# Patient Record
Sex: MALE | Race: WHITE | Employment: FULL TIME | URBAN - METROPOLITAN AREA
[De-identification: names, ages, dates, MRNs, and addresses within clinical notes are randomized per-mention and may not be internally consistent; named-entity substitution may affect disease eponyms.]

---

## 2020-07-13 ENCOUNTER — OFFICE VISIT (OUTPATIENT)
Dept: CARDIOLOGY CLINIC | Age: 55
End: 2020-07-13
Payer: OTHER GOVERNMENT

## 2020-07-13 VITALS
OXYGEN SATURATION: 98 % | TEMPERATURE: 98 F | WEIGHT: 202.8 LBS | DIASTOLIC BLOOD PRESSURE: 64 MMHG | HEIGHT: 71 IN | BODY MASS INDEX: 28.39 KG/M2 | SYSTOLIC BLOOD PRESSURE: 106 MMHG | HEART RATE: 96 BPM

## 2020-07-13 PROCEDURE — 99204 OFFICE O/P NEW MOD 45 MIN: CPT | Performed by: INTERNAL MEDICINE

## 2020-07-13 PROCEDURE — 93000 ELECTROCARDIOGRAM COMPLETE: CPT | Performed by: INTERNAL MEDICINE

## 2020-07-13 RX ORDER — METFORMIN HYDROCHLORIDE 500 MG/1
500 TABLET, EXTENDED RELEASE ORAL 2 TIMES DAILY WITH MEALS
COMMUNITY

## 2020-07-13 RX ORDER — DICLOFENAC SODIUM 75 MG/1
75 TABLET, DELAYED RELEASE ORAL 2 TIMES DAILY
COMMUNITY
Start: 2019-01-17

## 2020-07-13 RX ORDER — PANTOPRAZOLE SODIUM 40 MG/1
40 TABLET, DELAYED RELEASE ORAL DAILY
COMMUNITY

## 2020-07-13 RX ORDER — INSULIN GLARGINE 100 [IU]/ML
INJECTION, SOLUTION SUBCUTANEOUS
COMMUNITY

## 2020-07-13 RX ORDER — ASPIRIN 81 MG/1
81 TABLET, CHEWABLE ORAL DAILY
COMMUNITY

## 2020-07-13 RX ORDER — HYDRALAZINE HYDROCHLORIDE 25 MG/1
25 TABLET, FILM COATED ORAL 2 TIMES DAILY
COMMUNITY
End: 2020-07-13

## 2020-07-13 RX ORDER — LEVOTHYROXINE SODIUM 0.1 MG/1
100 TABLET ORAL DAILY
COMMUNITY

## 2020-07-13 RX ORDER — GABAPENTIN 100 MG/1
100 CAPSULE ORAL 3 TIMES DAILY
COMMUNITY

## 2020-07-13 NOTE — LETTER
[address] Cardiology - [facility]  [address] [place]  Phone: [phone]  Fax: [phone]    Maxim Wild MD        July 14, 2020     Josafat Monzon  [address]  [address] [address]    Patient: Kath Walsh  MR Number: [id number]  YOB: 1965  Date of Visit: 7/13/2020    Dear Dr. Josafat Monzon: Thank you for your referral. Progress note attached in visit summary. If you have questions, please do not hesitate to call me. I look forward to following David Uribe along with you.     Sincerely,        Maxim Wild MD

## 2020-07-13 NOTE — PATIENT INSTRUCTIONS
Patient Education        Tilt Table Test: About This Test  What is it? A tilt table test is used to check people who have fainted or who often feel lightheaded. The results help your doctor know the cause of your fainting or feeling lightheaded. The test uses a special table that slowly tilts you to an upright position. It checks how your body responds when you change positions. Why is this test done? This test is done to find out why you might be having certain symptoms, such as fainting or feeling lightheaded. Your doctor can see if your symptoms are caused by problems with your heart rate or blood pressure. How can you prepare for the test?  · You may be asked to not eat or drink for a few hours before the test.  · You may need to have someone drive you home after the test.  · Tell your doctor ALL the medicines, vitamins, supplements, and herbal remedies you take. Some may increase the risk of problems during your test. Your doctor will tell you if you should stop taking any of them before the test and how soon to do it. How is the test done? · The test is usually done in a hospital or a cardiologist's office. · You will have small patches or pads attached to your skin. These are sensors that monitor your heart. You will also have a blood pressure cuff on your arm. And you may have an IV. · During the test, you will lie flat on a table that can tilt you up to almost a standing position. You will be strapped securely to the table. · Your heart rate and blood pressure are checked regularly as the table is tilted up. · You will be asked if you feel any symptoms like nausea, sweating, dizziness, or an abnormal heartbeat. If you don't have any symptoms, you may be given medicine to speed up your heart rate. Then you will be checked for symptoms again. · If you faint during the test, the table will be returned to a flat position.  You will be checked closely and taken care of right away by your medical team. Most people wake up right away. · Your heart rate and blood pressure will be checked before you go home. What do the results of the test mean? The test result is normal if your blood pressure stays stable during the test and you do not feel lightheaded or faint. The test result is not normal if your blood pressure drops and you feel lightheaded or faint. These symptoms might happen because of a slow heart rate. How long does the test take? The test will take about an hour. It may take longer if you get medicine to speed up your heart during the test.  What happens after the test?  You can probably go back to your usual activities right away. But some people feel a little tired or nauseated. Follow-up care is a key part of your treatment and safety. Be sure to make and go to all appointments, and call your doctor if you are having problems. It's also a good idea to keep a list of the medicines you take. Ask your doctor when you can expect to have your test results. Where can you learn more? Go to https://[employer]. org and sign in to your [employer] account. Enter B539 in the Cynny box to learn more about \"Tilt Table Test: About This Test.\"     If you do not have an account, please click on the \"Sign Up Now\" link. Current as of: December 16, 2019               Content Version: 12.5  © 0399-3572 [employer]orporated. Care instructions adapted under license by [facility] ([facility]). If you have questions about a medical condition or this instruction, always ask your healthcare professional. [place] [address] any warranty or liability for your use of this information.

## 2020-07-13 NOTE — PROGRESS NOTES
[address]      Cardiology Consult    Jonathan Zavala  1965 July 13, 2020    Referring Physician: Sharon Thompson CNP  Reason for Referral: Syncope    CC: \"I passed out at work\"     HPI:  The patient is 53 y.o. male with a past medical history significant for hyperlipidemia, GERD and diabetes. He presents as referral regarding syncope and collapse. He was evaluated by neurology and did complete an EEG with normal results on 3/9/20. He reports ongoing dizzy spells with lightheadedness and headaches that has been occurring for the past couple years. He will become diaphoretic and often get sick with these episodes. The frequency of the episodes will vary. 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 He did not experience any symptoms of dizziness or other prodromal symptoms prior to passing out. He admits to shortness of breath that will be worsened with exertion especially when climbing the stairs. He admits to chest pain that will present in the middle of his chest and can last for 5-10 minutes or longer at times. This will typically present with rest and he denies exertional chest pain. He does not participate in any regular exercise and his activity level is limited due to a previous back injury. He does admit to smoking 1/2 ppd and denies recreational drug use or alcohol use. He reports cardiac history in three maternal family members. Past Medical History:   Diagnosis Date    Diabetes mellitus ([facility] [address]     --HgAIC 7.7 6/2020. Managed by PCP.  GERD (gastroesophageal reflux disease)     HLD (hyperlipidemia)     -- 1/2019. Managed by PCP.  HTN (hypertension)     Hypothyroidism     --TSH 9.93 6/2020. Managed by PCP    Syncope and collapse     --Echo 2015 LVEF normal, LVH. 48 hour HM 4/2019 normal.     Tobacco use disorder      History reviewed. No pertinent surgical history.   Family History   Problem Relation Age anxiety or depression. · Endocrine: No temperature intolerance. No excessive thirst, fluid intake, or urination. No tremor. · Hematologic/Lymphatic: No abnormal bruising or bleeding, blood clots or swollen lymph nodes. · Allergic/Immunologic: No nasal congestion or hives. Physical Exam:   /64 (Site: Left Upper Arm, Position: Standing, Cuff Size: Medium Adult)   Pulse 96   Temp 98 °F (36.7 °C)   Ht 5' 10.5\" (1.791 m)   Wt 202 lb 12.8 oz (92 kg)   SpO2 98%   BMI 28.69 kg/m²   Wt Readings from Last 3 Encounters:   07/13/20 202 lb 12.8 oz (92 kg)     Constitutional: He is oriented to person, place, and time. He appears well-developed and well-nourished. In no acute distress. Head: Normocephalic and atraumatic. Pupils equal and round. Neck: Neck supple. No JVP or carotid bruit appreciated. No mass and no thyromegaly present. No lymphadenopathy present. Cardiovascular: Normal rate. Normal heart sounds. Exam reveals no gallop and no friction rub. No murmur heard. Pulmonary/Chest: Effort normal and breath sounds normal. No respiratory distress. He has no wheezes, rhonchi or rales. Abdominal: Soft, non-tender. Bowel sounds are normal. He exhibits no organomegaly, mass or bruit. Extremities: No edema. No cyanosis or clubbing. Pulses are 2+ radial/carotid bilaterally. Neurological: No gross cranial nerve deficit. Coordination normal.   Skin: Skin is warm and dry. There is no rash or diaphoresis. Psychiatric: He has a normal mood and affect. His speech is normal and behavior is normal.     Lab Review:   FLP:    Lab Results   Component Value Date    TRIG 352 07/08/2011    HDL 27 07/08/2011    LDLDIRECT 127 07/08/2011     BUN/Creatinine:    Lab Results   Component Value Date    BUN 14 08/29/2012    CREATININE 0.9 08/29/2012       EKG Interpretation 7/13/20: NSR, incomplete RBBB    Image Review:     Stress perfusion 7/8/11  1.  Normal myocardial perfusion imaging.     2.  Normal left ventricular systolic function. 3. Levada Echavarria is no prior study for comparison. Echo 8/18/15  Left ventricle cavity is normal in size. Mild asymmetric hypertrophy of left ventricle   Normal global wall motion  Visual EF 55-60%  No wall motion abnormalities     Carotid duplex 4/12/18  Bilateral internal and external carotid arteries reveal no significant plaque or measurable stenosis. Bilateral vertebral arteries appear patent with antegrade flow. Subclavian arteries revealed normal multiphasic flow. Holter monitor 4/18/19  Essentially normal 48 hour Holter monitor. Patient did no return symptoms diary. Assessment/Plan:   1. Syncope and collapse Possibly vasovagal or orthostasis. 2. Essential hypertension   3. Type 2 diabetes   4. Chest pain, atypical     Syncopal episodes and positive orthostatic readings today in office. Will complete echocardiogram, tilt table test and event monitor to evaluate further. Chest pain atypical and will pursue stress testing if becomes exertional. Encouraged smoking cessation. Follow up in office 6 weeks        This note was scribed in the presence of Lucille Alaniz MD by General French RN.

## 2020-07-24 ENCOUNTER — TELEPHONE (OUTPATIENT)
Dept: CARDIOLOGY CLINIC | Age: 55
End: 2020-07-24

## 2020-08-11 ENCOUNTER — HOSPITAL ENCOUNTER (OUTPATIENT)
Dept: CARDIAC CATH/INVASIVE PROCEDURES | Age: 55
Discharge: HOME | End: 2020-08-11
Payer: OTHER GOVERNMENT

## 2020-08-11 VITALS
WEIGHT: 202 LBS | OXYGEN SATURATION: 99 % | BODY MASS INDEX: 28.92 KG/M2 | RESPIRATION RATE: 16 BRPM | HEIGHT: 70 IN | SYSTOLIC BLOOD PRESSURE: 129 MMHG | DIASTOLIC BLOOD PRESSURE: 72 MMHG | HEART RATE: 97 BPM

## 2020-08-11 PROCEDURE — 93660 TILT TABLE EVALUATION: CPT | Performed by: INTERNAL MEDICINE

## 2020-08-11 PROCEDURE — 93660 TILT TABLE EVALUATION: CPT | Performed by: FAMILY MEDICINE

## 2020-08-11 RX ORDER — SODIUM CHLORIDE 0.9 % (FLUSH) 0.9 %
10 SYRINGE (ML) INJECTION PRN
Status: DISCONTINUED | OUTPATIENT
Start: 2020-08-11 | End: 2020-08-12 | Stop reason: HOSPADM

## 2020-08-11 RX ORDER — SODIUM CHLORIDE 9 MG/ML
INJECTION, SOLUTION INTRAVENOUS CONTINUOUS
Status: DISCONTINUED | OUTPATIENT
Start: 2020-08-11 | End: 2020-08-12 | Stop reason: HOSPADM

## 2020-08-11 RX ORDER — SODIUM CHLORIDE 0.9 % (FLUSH) 0.9 %
10 SYRINGE (ML) INJECTION EVERY 12 HOURS SCHEDULED
Status: DISCONTINUED | OUTPATIENT
Start: 2020-08-11 | End: 2020-08-11 | Stop reason: ALTCHOICE

## 2020-08-11 RX ORDER — SODIUM CHLORIDE 0.9 % (FLUSH) 0.9 %
10 SYRINGE (ML) INJECTION EVERY 12 HOURS SCHEDULED
Status: DISCONTINUED | OUTPATIENT
Start: 2020-08-11 | End: 2020-08-12 | Stop reason: HOSPADM

## 2020-08-11 RX ORDER — SODIUM CHLORIDE 0.9 % (FLUSH) 0.9 %
10 SYRINGE (ML) INJECTION PRN
Status: DISCONTINUED | OUTPATIENT
Start: 2020-08-11 | End: 2020-08-11 | Stop reason: ALTCHOICE

## 2020-08-11 NOTE — H&P
H&P Update    I have reviewed the history and physical from Dr. Susana Valentin on 2020 (see below) and examined the patient and find no relevant changes. I have reviewed with the patient and/or family the risks, benefits, and alternatives to the procedure. Pre-sedation Assessment    Patient:  Marco A Carbone   :   1965  Intended Procedure: Tilt Table Test for syncope of unclear etiology      [facility] nurses notes reviewed and agreed. Medications reviewed  Allergies: No Known Allergies      Pre-Procedure Assessment/Plan:  ASA 2 - Patient with mild systemic disease with no functional limitations    Level of Sedation Plan:No sedation    Post Procedure plan: Return to same level of care    -----------------------------------------    [address]                                                  Cardiology Consult     Marco A Carbone  1965     Referring Physician: Kusum Landeros CNP  Reason for Referral: Syncope     CC: \"I passed out at work\"      HPI:  The patient is 53 y.o. male with a past medical history significant for hyperlipidemia, GERD and diabetes. He presents as referral regarding syncope and collapse. He was evaluated by neurology and did complete an EEG with normal results on 3/9/20. He reports ongoing dizzy spells with lightheadedness and headaches that has been occurring for the past couple years. He will become diaphoretic and often get sick with these episodes. The frequency of the episodes will vary. 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 He did not experience any symptoms of dizziness or other prodromal symptoms prior to passing out. He admits to shortness of breath that will be worsened with exertion especially when climbing the stairs. He admits to chest pain that will present in the middle of his chest and can last for 5-10 minutes or longer at times.  This will typically present with rest and he denies exertional chest pain. He does not participate in any regular exercise and his activity level is limited due to a previous back injury.      He does admit to smoking 1/2 ppd and denies recreational drug use or alcohol use. He reports cardiac history in three maternal family members.      Past Medical History        Past Medical History:   Diagnosis Date    Diabetes mellitus ([facility] [address]       --HgAIC 7.7 6/2020. Managed by PCP.  GERD (gastroesophageal reflux disease)      HLD (hyperlipidemia)       -- 1/2019. Managed by PCP.  HTN (hypertension)      Hypothyroidism       --TSH 9.93 6/2020. Managed by PCP    Syncope and collapse       --Echo 2015 LVEF normal, LVH. 48 hour HM 4/2019 normal.     Tobacco use disorder          Past Surgical History   History reviewed. No pertinent surgical history.      Family History         Family History   Problem Relation Age of Onset    Hypertension Mother      Asthma Father      Heart Attack Maternal Aunt      Heart Attack Maternal Uncle      Heart Attack Maternal Grandfather          Social History            Tobacco Use    Smoking status: Current Some Day Smoker       Packs/day: 0.50       Years: 30.00       Pack years: 15.00       Types: Cigarettes    Smokeless tobacco: Never Used   Substance Use Topics    Alcohol use: Not Currently    Drug use: Never        No Known Allergies  Current Facility-Administered Medications          Current Outpatient Medications   Medication Sig Dispense Refill    insulin glargine (BASAGLAR KWIKPEN) 100 UNIT/ML injection pen Inject into the skin        aspirin 81 MG chewable tablet Take 81 mg by mouth daily        gabapentin (NEURONTIN) 100 MG capsule Take 100 mg by mouth 3 times daily.        diclofenac (VOLTAREN) 75 MG EC tablet Take 75 mg by mouth 2 times daily        metFORMIN (GLUCOPHAGE-XR) 500 MG extended release tablet Take 500 mg by mouth 2 times daily (with meals)        pantoprazole (PROTONIX) 40 MG tablet Take atypical and will pursue stress testing if becomes exertional. Encouraged smoking cessation.

## 2020-08-11 NOTE — PROCEDURES
[address]  [address] [facility]  [address] [facility] [address]  [phone]

## 2020-08-31 ENCOUNTER — TELEPHONE (OUTPATIENT)
Dept: CARDIOLOGY CLINIC | Age: 55
End: 2020-08-31

## 2020-09-08 ENCOUNTER — TELEPHONE (OUTPATIENT)
Dept: CARDIOLOGY CLINIC | Age: 55
End: 2020-09-08

## 2020-09-08 NOTE — TELEPHONE ENCOUNTER
Reviewed event monitor results. Showed normal sinus rhythm and no correlation with the patients symptoms and any arrhythmias. Follow up as scheduled.
